# Patient Record
Sex: MALE | Race: WHITE | URBAN - METROPOLITAN AREA
[De-identification: names, ages, dates, MRNs, and addresses within clinical notes are randomized per-mention and may not be internally consistent; named-entity substitution may affect disease eponyms.]

---

## 2021-08-19 ENCOUNTER — OFFICE VISIT (OUTPATIENT)
Dept: URBAN - METROPOLITAN AREA CLINIC 72 | Facility: CLINIC | Age: 66
End: 2021-08-19
Payer: MEDICARE

## 2021-08-19 DIAGNOSIS — H25.813 COMBINED FORMS OF AGE-RELATED CATARACT, BILATERAL: Primary | ICD-10-CM

## 2021-08-19 DIAGNOSIS — H53.2 DIPLOPIA: ICD-10-CM

## 2021-08-19 DIAGNOSIS — H52.4 PRESBYOPIA: ICD-10-CM

## 2021-08-19 PROCEDURE — 99204 OFFICE O/P NEW MOD 45 MIN: CPT | Performed by: OPTOMETRIST

## 2024-12-12 NOTE — IMPRESSION/PLAN
Impression: Diplopia: H53.2. - New spec Rx given - not required for dist. Near visiont needs adjusted prism so discussed need for separate pair. 

 - Pt having MRI soon, will forward results to me. Discussed possible indications for diplopia cause and visual field issues depending on location of damage. 

 - PT ok for 1 year for now, discussed that cataract surgery may improve BCVA, but recommend waiting until other health/brain issues are diagnosed/treated to not add stress to pt.  Plan: RTC 1 year/PRN yes